# Patient Record
Sex: FEMALE | Race: WHITE | NOT HISPANIC OR LATINO | Employment: FULL TIME | URBAN - METROPOLITAN AREA
[De-identification: names, ages, dates, MRNs, and addresses within clinical notes are randomized per-mention and may not be internally consistent; named-entity substitution may affect disease eponyms.]

---

## 2018-08-22 ENCOUNTER — TELEPHONE (OUTPATIENT)
Dept: TRANSPLANT | Facility: CLINIC | Age: 53
End: 2018-08-22

## 2018-08-22 NOTE — TELEPHONE ENCOUNTER
----- Message from Miller Phipps sent at 8/22/2018 11:20 AM CDT -----  We have the pt recorders, but there are no labs. Will call MD office and have them fax pt labs.    Called Dr. TISH willson ,ref MD, office @ [phone] and [facility] for them to fax pt recs to [phone]

## 2018-08-22 NOTE — TELEPHONE ENCOUNTER
----- Message from Ila Lennon RN sent at 8/22/2018 12:12 PM CDT -----  Contact: [facility]       ----- Message -----  From: Payton Resendiz  Sent: 8/22/2018  11:52 AM  To: [facility] Referral Pool    Needs Advice    Reason for call:   Calling to touch base and inform Teion that NO labs were done  Communication Preference: [phone]  Additional Information: n/a

## 2018-08-24 ENCOUNTER — TELEPHONE (OUTPATIENT)
Dept: TRANSPLANT | Facility: CLINIC | Age: 53
End: 2018-08-24

## 2018-08-24 NOTE — TELEPHONE ENCOUNTER
Initial referral received from Dr Lewis Alton    Patient with two large hemangiomas.    Referred for liver transplant for SURGICAL CONSULTReferral completed and forwarded to [facility].      Insurance: [facility] [employer] [place] #[id number]  Policy # [id number]  Contact # [phone]

## 2018-08-24 NOTE — TELEPHONE ENCOUNTER
----- Message from Yue Soto sent at 8/24/2018 12:20 PM CDT -----  Contact: Self  Ms. Garces returned a call from Martine farrar regarding her referral to Liver Transplant.    Please give her a call at [phone] (p) regarding this message.    Thanks.

## 2018-08-24 NOTE — TELEPHONE ENCOUNTER
Spoke to pt re need for CD's. Pt agreed to  on Monday and call, at that time she will be scheduled. Pt agreed.

## 2018-08-27 ENCOUNTER — TELEPHONE (OUTPATIENT)
Dept: TRANSPLANT | Facility: CLINIC | Age: 53
End: 2018-08-27

## 2018-08-27 DIAGNOSIS — R16.0 LIVER MASS: Primary | ICD-10-CM

## 2018-08-27 NOTE — PROGRESS NOTES
Pt called agreed to appt with Dr. Frank on Friday for surgical consult at 140 with labs prior.  Instructions for the appt given.

## 2018-08-28 ENCOUNTER — TELEPHONE (OUTPATIENT)
Dept: TRANSPLANT | Facility: CLINIC | Age: 53
End: 2018-08-28

## 2018-08-28 NOTE — TELEPHONE ENCOUNTER
----- Message from Miller Phipps sent at 8/28/2018 10:44 AM CDT -----  Contact: Patient  Returned pt call and told her that her liver dx will not affect her stress test. She also wanted to re.samuel her sx ppt to [facility]. Pt appts re/samuel for 8/30 for 2:30/4pm  ----- Message -----  From: Erin Carlyle  Sent: 8/28/2018  10:21 AM  To: [facility] Referral Pool    Needs Advice    Reason for call:   Pt would like to speak to Martine in reference to her cardio appt. She's afraid she might can not preform the treadmill test due to her liver condition. Please call for advising.   Communication Preference: [phone]  Additional Information: n/a

## 2018-08-30 ENCOUNTER — LAB VISIT (OUTPATIENT)
Dept: LAB | Facility: HOSPITAL | Age: 53
End: 2018-08-30
Attending: TRANSPLANT SURGERY
Payer: COMMERCIAL

## 2018-08-30 ENCOUNTER — TELEPHONE (OUTPATIENT)
Dept: TRANSPLANT | Facility: CLINIC | Age: 53
End: 2018-08-30

## 2018-08-30 ENCOUNTER — EVALUATION (OUTPATIENT)
Dept: TRANSPLANT | Facility: CLINIC | Age: 53
End: 2018-08-30
Payer: COMMERCIAL

## 2018-08-30 VITALS
TEMPERATURE: 99 F | DIASTOLIC BLOOD PRESSURE: 78 MMHG | HEART RATE: 78 BPM | OXYGEN SATURATION: 97 % | BODY MASS INDEX: 26.31 KG/M2 | WEIGHT: 154.13 LBS | SYSTOLIC BLOOD PRESSURE: 132 MMHG | HEIGHT: 64 IN | RESPIRATION RATE: 18 BRPM

## 2018-08-30 DIAGNOSIS — R16.0 LIVER MASS: ICD-10-CM

## 2018-08-30 DIAGNOSIS — D18.03 HEMANGIOMA OF LIVER: Primary | ICD-10-CM

## 2018-08-30 LAB
ABO + RH BLD: NORMAL
AFP SERPL-MCNC: 2 NG/ML
ALBUMIN SERPL BCP-MCNC: 4.2 G/DL
ALP SERPL-CCNC: 78 U/L
ALT SERPL W/O P-5'-P-CCNC: 8 U/L
ANION GAP SERPL CALC-SCNC: 10 MMOL/L
AST SERPL-CCNC: 14 U/L
BASOPHILS # BLD AUTO: 0.04 K/UL
BASOPHILS NFR BLD: 0.5 %
BILIRUB DIRECT SERPL-MCNC: 0.1 MG/DL
BILIRUB SERPL-MCNC: 0.3 MG/DL
BLD GP AB SCN CELLS X3 SERPL QL: NORMAL
BUN SERPL-MCNC: 14 MG/DL
CALCIUM SERPL-MCNC: 10.4 MG/DL
CANCER AG19-9 SERPL-ACNC: 3 U/ML
CHLORIDE SERPL-SCNC: 106 MMOL/L
CO2 SERPL-SCNC: 26 MMOL/L
CREAT SERPL-MCNC: 0.7 MG/DL
DIFFERENTIAL METHOD: NORMAL
EOSINOPHIL # BLD AUTO: 0.1 K/UL
EOSINOPHIL NFR BLD: 1.6 %
ERYTHROCYTE [DISTWIDTH] IN BLOOD BY AUTOMATED COUNT: 13.8 %
EST. GFR  (AFRICAN AMERICAN): >60 ML/MIN/1.73 M^2
EST. GFR  (NON AFRICAN AMERICAN): >60 ML/MIN/1.73 M^2
GGT SERPL-CCNC: 22 U/L
GLUCOSE SERPL-MCNC: 104 MG/DL
HCT VFR BLD AUTO: 40 %
HGB BLD-MCNC: 13.1 G/DL
IMM GRANULOCYTES # BLD AUTO: 0.02 K/UL
IMM GRANULOCYTES NFR BLD AUTO: 0.2 %
INR PPP: 1.3
LYMPHOCYTES # BLD AUTO: 2.2 K/UL
LYMPHOCYTES NFR BLD: 27.1 %
MCH RBC QN AUTO: 28.2 PG
MCHC RBC AUTO-ENTMCNC: 32.8 G/DL
MCV RBC AUTO: 86 FL
MONOCYTES # BLD AUTO: 0.5 K/UL
MONOCYTES NFR BLD: 6.6 %
NEUTROPHILS # BLD AUTO: 5.1 K/UL
NEUTROPHILS NFR BLD: 64 %
NRBC BLD-RTO: 0 /100 WBC
PLATELET # BLD AUTO: 219 K/UL
PMV BLD AUTO: 11.8 FL
POTASSIUM SERPL-SCNC: 4 MMOL/L
PROT SERPL-MCNC: 7.3 G/DL
PROTHROMBIN TIME: 13.1 SEC
RBC # BLD AUTO: 4.65 M/UL
SODIUM SERPL-SCNC: 142 MMOL/L
WBC # BLD AUTO: 8.04 K/UL

## 2018-08-30 PROCEDURE — 80053 COMPREHEN METABOLIC PANEL: CPT | Mod: NTX

## 2018-08-30 PROCEDURE — 82105 ALPHA-FETOPROTEIN SERUM: CPT | Mod: NTX

## 2018-08-30 PROCEDURE — 99999 PR PBB SHADOW E&M-EST. PATIENT-LVL III: CPT | Mod: PBBFAC,TXP,,

## 2018-08-30 PROCEDURE — 85610 PROTHROMBIN TIME: CPT | Mod: NTX

## 2018-08-30 PROCEDURE — 86901 BLOOD TYPING SEROLOGIC RH(D): CPT | Mod: NTX

## 2018-08-30 PROCEDURE — 85025 COMPLETE CBC W/AUTO DIFF WBC: CPT | Mod: NTX

## 2018-08-30 PROCEDURE — 82248 BILIRUBIN DIRECT: CPT | Mod: NTX

## 2018-08-30 PROCEDURE — 3008F BODY MASS INDEX DOCD: CPT | Mod: CPTII,S$GLB,TXP, | Performed by: TRANSPLANT SURGERY

## 2018-08-30 PROCEDURE — 82977 ASSAY OF GGT: CPT | Mod: TXP

## 2018-08-30 PROCEDURE — 99203 OFFICE O/P NEW LOW 30 MIN: CPT | Mod: S$GLB,TXP,, | Performed by: TRANSPLANT SURGERY

## 2018-08-30 PROCEDURE — 86301 IMMUNOASSAY TUMOR CA 19-9: CPT | Mod: TXP

## 2018-08-30 NOTE — PROGRESS NOTES
Liver Mass Evaluation    Referring Physician: Joshua Dang  Corresponding Physician: Joshua Dang    Subjective:     Reason for Visit: evaluate for liver mass    History of Present Illness: Yoanna Garces is a 52 y.o. year old female who is being evaluated for liver mass.     Patient is asymptomatic.   During a preoperative evaluation for a hand mass resection, a liver mass was incidentally found. CT scan shows two masses consistent with hemangiomas:  12 x 8.5 cms in Seg 2-3; and 8.7 x 6.3 cms Seg 6-7.  Ca 19-9 normal, 3.0. Platelets normal.     Manifestations of liver disease: none  MELD-Na score: 9 at 8/30/2018  3:20 PM  MELD score: 9 at 8/30/2018  3:20 PM  Calculated from:  Serum Creatinine: 0.7 mg/dL (Rounded to 1 mg/dL) at 8/30/2018  3:20 PM  Serum Sodium: 142 mmol/L (Rounded to 137 mmol/L) at 8/30/2018  3:20 PM  Total Bilirubin: 0.3 mg/dL (Rounded to 1 mg/dL) at 8/30/2018  3:20 PM  INR(ratio): 1.3 at 8/30/2018  3:20 PM  Age: 52 years    PMH: reviewed  PSH: reviewed    Review of Systems   Cardiovascular: Negative for leg swelling.   Gastrointestinal: Negative for abdominal distention and abdominal pain.   All other systems reviewed and are negative.        Objective:     Physical Exam:  Constitutional:   Vitals reviewed: yes   Well-nourished and well-groomed: yes  Eyes:   Sclerae icteric: no   Extraocular movements intact: yes  GI:    Bowel sounds normal: yes   Tenderness: no    If yes, quadrant/location: not applicable   Palpable masses: no    If yes, quadrant/location: not applicable   Hepatosplenomegaly: no   Ascites: no   Hernia: no    If yes, type/location: not applicable   Surgical scars: no    If yes, type/location: not applicable  Resp:   Effort normal: yes   Breath sounds normal: yes    CV:   Regular rate and rhythm: yes   Heart sounds normal: yes   Femoral pulses normal: yes   Extremities edematous: no  Skin:   Rashes or lesions present: no    If yes, describe:not applicable   Jaundice::  no    Musculoskeletal:   Gait normal: yes   Strength normal: yes  Psych:   Oriented to person, place, and time: yes   Affect and mood normal: yes    Additional comments: not applicable         Transplant Surgery - Candidacy   Assessment/Plan:   Will review the images (CT scan) in our IR conference and then communicate the plan.     Deondre Frank MD

## 2018-09-10 ENCOUNTER — TELEPHONE (OUTPATIENT)
Dept: TRANSPLANT | Facility: CLINIC | Age: 53
End: 2018-09-10

## 2018-09-10 NOTE — TELEPHONE ENCOUNTER
Patient: Yoanna Garces       MRN: [id number]      : 1965     Age: 52 y.o.  [address] [place] [address]    Provider: Dr Frank     Urgency of review: non-urgent    Patient Transplant Status: Other question if need surgical intervention     Reason for presentation: surgical intervention    Clinical Summary: During a preoperative evaluation for a hand mass resection, a liver mass was incidentally found. CT scan shows two masses consistent with hemangiomas:  12 x 8.5 cms in Seg 2-3; and 8.7 x 6.3 cms Seg 6-7.  Ca 19-9 normal, 3.0.          Imaging to be reviewed: CT 18   CT scan shows two masses consistent with hemangiomas:  12 x 8.5 cms in Seg 2-3; and 8.7 x 6.3 cms Seg 6-7.    HCC Treatment History: na    ABO: A POS    Platelets:   Lab Results   Component Value Date/Time     2018 03:20 PM     Creatinine:   Lab Results   Component Value Date/Time    CREATININE 0.7 2018 03:20 PM     Bilirubin:   Lab Results   Component Value Date/Time    BILITOT 0.3 2018 03:20 PM     AFP Last 3 each if available:   Lab Results   Component Value Date/Time    AFP 2.0 2018 03:20 PM       MELD: MELD-Na score: 9 at 2018  3:20 PM  MELD score: 9 at 2018  3:20 PM  Calculated from:  Serum Creatinine: 0.7 mg/dL (Rounded to 1 mg/dL) at 2018  3:20 PM  Serum Sodium: 142 mmol/L (Rounded to 137 mmol/L) at 2018  3:20 PM  Total Bilirubin: 0.3 mg/dL (Rounded to 1 mg/dL) at 2018  3:20 PM  INR(ratio): 1.3 at 2018  3:20 PM  Age: 52 years    Plan: During a preoperative evaluation for a hand mass resection, a liver mass was incidentally found. CT scan shows two masses consistent with hemangiomas:  12 x 8.5 cms in Seg 2-3; and 8.7 x 6.3 cms Seg 6-7.  Ca 19-9 normal, 3.0. Platelets normal.         Follow-up Provider: Dr frank /sadiq NGUYEN.

## 2018-09-10 NOTE — TELEPHONE ENCOUNTER
Patient: Yoanna Garces       MRN: [id number]      : 1965     Age: 52 y.o.  [address] [place] [address]    Provider: Dr Frank     Urgency of review: non-urgent    Patient Transplant Status: Other question if need surgical intervention     Reason for presentation: surgical intervention    Clinical Summary: During a preoperative evaluation for a hand mass resection, a liver mass was incidentally found. CT scan shows two masses consistent with hemangiomas:  12 x 8.5 cms in Seg 2-3; and 8.7 x 6.3 cms Seg 6-7.  Ca 19-9 normal, 3.0.      Imaging to be reviewed: CT 18   CT scan shows two masses consistent with hemangiomas:  12 x 8.5 cms in Seg 2-3; and 8.7 x 6.3 cms Seg 6-7.    HCC Treatment History: na    ABO: A POS    Platelets:   Lab Results   Component Value Date/Time     2018 03:20 PM     Creatinine:   Lab Results   Component Value Date/Time    CREATININE 0.7 2018 03:20 PM     Bilirubin:   Lab Results   Component Value Date/Time    BILITOT 0.3 2018 03:20 PM     AFP Last 3 each if available:   Lab Results   Component Value Date/Time    AFP 2.0 2018 03:20 PM       MELD: MELD-Na score: 9 at 2018  3:20 PM  MELD score: 9 at 2018  3:20 PM  Calculated from:  Serum Creatinine: 0.7 mg/dL (Rounded to 1 mg/dL) at 2018  3:20 PM  Serum Sodium: 142 mmol/L (Rounded to 137 mmol/L) at 2018  3:20 PM  Total Bilirubin: 0.3 mg/dL (Rounded to 1 mg/dL) at 2018  3:20 PM  INR(ratio): 1.3 at 2018  3:20 PM  Age: 52 years    Plan: Two lesions (right and left lobe of the liver ) with peripheral nodular enhancement with progressive fill in consistent with hemangiomas.   ---2 large lesions, right and left lobe, consistent with hemangiomas.  No additional recommendations.    Note forwarded to MASON Rodriguez RN and Jeff Holland RN to coordinate follow-up    Follow-up Provider: Dr frank /jeff NGUYEN.

## 2018-09-11 ENCOUNTER — TELEPHONE (OUTPATIENT)
Dept: TRANSPLANT | Facility: CLINIC | Age: 53
End: 2018-09-11

## 2018-09-11 NOTE — TELEPHONE ENCOUNTER
----- Message from Shira Colunga sent at 9/10/2018  4:00 PM CDT -----  Contact: patient      ----- Message -----  From: Lima Fox MA  Sent: 9/10/2018   3:58 PM  To: [facility] Pre-Liver Transplant Clinical        ----- Message -----  From: Payton Resendiz  Sent: 9/10/2018   3:50 PM  To: [facility] Hepat Clinical Staff      Reason for call:   Patient would like update on conference.   Communication Preference:  [phone]  Additional Information: n/a

## 2018-09-13 ENCOUNTER — TELEPHONE (OUTPATIENT)
Dept: TRANSPLANT | Facility: CLINIC | Age: 53
End: 2018-09-13

## 2018-09-13 NOTE — TELEPHONE ENCOUNTER
----- Message from Demetra Enriquez sent at 9/12/2018  4:05 PM CDT -----  Patient Returning Call from [facility]    Who Left Message for Patient: Jeff Holland  Communication Preference: [phone]  Additional Information:

## 2018-09-18 ENCOUNTER — CONFERENCE (OUTPATIENT)
Dept: TRANSPLANT | Facility: CLINIC | Age: 53
End: 2018-09-18

## 2018-09-18 ENCOUNTER — TELEPHONE (OUTPATIENT)
Dept: TRANSPLANT | Facility: CLINIC | Age: 53
End: 2018-09-18

## 2018-09-18 NOTE — TELEPHONE ENCOUNTER
Placed call to patient informed her that what was noted on imaging is hemangioma's and no need for surgery

## 2018-09-18 NOTE — TELEPHONE ENCOUNTER
----- Message from Burke Rain sent at 9/17/2018  3:25 PM CDT -----  Contact: patient   Patient calling for lab results. Patient states can she please receive a called back today if possible.           Please call   [phone]       Thanks!

## 2021-09-27 ENCOUNTER — OFFICE VISIT (OUTPATIENT)
Dept: WOUND CARE | Facility: HOSPITAL | Age: 56
End: 2021-09-27
Attending: SURGERY
Payer: COMMERCIAL

## 2021-09-27 VITALS
RESPIRATION RATE: 20 BRPM | HEART RATE: 80 BPM | DIASTOLIC BLOOD PRESSURE: 85 MMHG | SYSTOLIC BLOOD PRESSURE: 171 MMHG | TEMPERATURE: 98 F

## 2021-09-27 DIAGNOSIS — T81.31XA DEHISCENCE OF OPERATIVE WOUND, INITIAL ENCOUNTER: ICD-10-CM

## 2021-09-27 DIAGNOSIS — S71.132A PUNCTURE WOUND OF LEFT THIGH, INITIAL ENCOUNTER: ICD-10-CM

## 2021-09-27 DIAGNOSIS — T81.89XA SURGICAL WOUND, NON HEALING, INITIAL ENCOUNTER: ICD-10-CM

## 2021-09-27 PROCEDURE — 1159F MED LIST DOCD IN RCRD: CPT | Mod: CPTII,,, | Performed by: SURGERY

## 2021-09-27 PROCEDURE — 99499 UNLISTED E&M SERVICE: CPT | Mod: ,,, | Performed by: SURGERY

## 2021-09-27 PROCEDURE — 1159F PR MEDICATION LIST DOCUMENTED IN MEDICAL RECORD: ICD-10-PCS | Mod: CPTII,,, | Performed by: SURGERY

## 2021-09-27 PROCEDURE — 3079F DIAST BP 80-89 MM HG: CPT | Mod: CPTII,,, | Performed by: SURGERY

## 2021-09-27 PROCEDURE — 99203 OFFICE O/P NEW LOW 30 MIN: CPT | Mod: 25

## 2021-09-27 PROCEDURE — 1160F PR REVIEW ALL MEDS BY PRESCRIBER/CLIN PHARMACIST DOCUMENTED: ICD-10-PCS | Mod: CPTII,,, | Performed by: SURGERY

## 2021-09-27 PROCEDURE — 27201912 HC WOUND CARE DEBRIDEMENT SUPPLIES

## 2021-09-27 PROCEDURE — 11042 DBRDMT SUBQ TIS 1ST 20SQCM/<: CPT

## 2021-09-27 PROCEDURE — 3077F PR MOST RECENT SYSTOLIC BLOOD PRESSURE >= 140 MM HG: ICD-10-PCS | Mod: CPTII,,, | Performed by: SURGERY

## 2021-09-27 PROCEDURE — 99499 NO LOS: ICD-10-PCS | Mod: ,,, | Performed by: SURGERY

## 2021-09-27 PROCEDURE — 3079F PR MOST RECENT DIASTOLIC BLOOD PRESSURE 80-89 MM HG: ICD-10-PCS | Mod: CPTII,,, | Performed by: SURGERY

## 2021-09-27 PROCEDURE — 3077F SYST BP >= 140 MM HG: CPT | Mod: CPTII,,, | Performed by: SURGERY

## 2021-09-27 PROCEDURE — 1160F RVW MEDS BY RX/DR IN RCRD: CPT | Mod: CPTII,,, | Performed by: SURGERY

## 2021-10-04 ENCOUNTER — OFFICE VISIT (OUTPATIENT)
Dept: WOUND CARE | Facility: HOSPITAL | Age: 56
End: 2021-10-04
Attending: SURGERY
Payer: COMMERCIAL

## 2021-10-04 VITALS
SYSTOLIC BLOOD PRESSURE: 167 MMHG | RESPIRATION RATE: 18 BRPM | HEART RATE: 99 BPM | TEMPERATURE: 99 F | DIASTOLIC BLOOD PRESSURE: 81 MMHG

## 2021-10-04 DIAGNOSIS — T81.31XD DEHISCENCE OF OPERATIVE WOUND, SUBSEQUENT ENCOUNTER: ICD-10-CM

## 2021-10-04 PROCEDURE — 11042 DBRDMT SUBQ TIS 1ST 20SQCM/<: CPT

## 2021-10-04 PROCEDURE — 3077F PR MOST RECENT SYSTOLIC BLOOD PRESSURE >= 140 MM HG: ICD-10-PCS | Mod: CPTII,,, | Performed by: SURGERY

## 2021-10-04 PROCEDURE — 3079F PR MOST RECENT DIASTOLIC BLOOD PRESSURE 80-89 MM HG: ICD-10-PCS | Mod: CPTII,,, | Performed by: SURGERY

## 2021-10-04 PROCEDURE — 3077F SYST BP >= 140 MM HG: CPT | Mod: CPTII,,, | Performed by: SURGERY

## 2021-10-04 PROCEDURE — 27201912 HC WOUND CARE DEBRIDEMENT SUPPLIES

## 2021-10-04 PROCEDURE — 99499 NO LOS: ICD-10-PCS | Mod: ,,, | Performed by: SURGERY

## 2021-10-04 PROCEDURE — 99499 UNLISTED E&M SERVICE: CPT | Mod: ,,, | Performed by: SURGERY

## 2021-10-04 PROCEDURE — 3079F DIAST BP 80-89 MM HG: CPT | Mod: CPTII,,, | Performed by: SURGERY

## 2021-10-18 ENCOUNTER — OFFICE VISIT (OUTPATIENT)
Dept: WOUND CARE | Facility: HOSPITAL | Age: 56
End: 2021-10-18
Attending: SURGERY
Payer: COMMERCIAL

## 2021-10-18 VITALS
RESPIRATION RATE: 16 BRPM | TEMPERATURE: 98 F | DIASTOLIC BLOOD PRESSURE: 67 MMHG | HEART RATE: 82 BPM | SYSTOLIC BLOOD PRESSURE: 121 MMHG

## 2021-10-18 DIAGNOSIS — T81.31XA SURGICAL WOUND DEHISCENCE: ICD-10-CM

## 2021-10-18 DIAGNOSIS — T81.89XA SURGICAL WOUND, NON HEALING, INITIAL ENCOUNTER: ICD-10-CM

## 2021-10-18 DIAGNOSIS — T81.31XD DEHISCENCE OF OPERATIVE WOUND, SUBSEQUENT ENCOUNTER: ICD-10-CM

## 2021-10-18 PROCEDURE — 99499 UNLISTED E&M SERVICE: CPT | Mod: ,,, | Performed by: SURGERY

## 2021-10-18 PROCEDURE — 3078F PR MOST RECENT DIASTOLIC BLOOD PRESSURE < 80 MM HG: ICD-10-PCS | Mod: CPTII,,, | Performed by: SURGERY

## 2021-10-18 PROCEDURE — 99499 NO LOS: ICD-10-PCS | Mod: ,,, | Performed by: SURGERY

## 2021-10-18 PROCEDURE — 99212 OFFICE O/P EST SF 10 MIN: CPT

## 2021-10-18 PROCEDURE — 3074F SYST BP LT 130 MM HG: CPT | Mod: CPTII,,, | Performed by: SURGERY

## 2021-10-18 PROCEDURE — 3074F PR MOST RECENT SYSTOLIC BLOOD PRESSURE < 130 MM HG: ICD-10-PCS | Mod: CPTII,,, | Performed by: SURGERY

## 2021-10-18 PROCEDURE — 3078F DIAST BP <80 MM HG: CPT | Mod: CPTII,,, | Performed by: SURGERY
